# Patient Record
Sex: FEMALE | Race: WHITE | HISPANIC OR LATINO | Employment: UNEMPLOYED | URBAN - METROPOLITAN AREA
[De-identification: names, ages, dates, MRNs, and addresses within clinical notes are randomized per-mention and may not be internally consistent; named-entity substitution may affect disease eponyms.]

---
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
## 2025-03-17 ENCOUNTER — PATIENT OUTREACH (OUTPATIENT)
Dept: CARE COORDINATION | Facility: CLINIC | Age: 20
End: 2025-03-17
Payer: OTHER GOVERNMENT

## 2025-06-10 ENCOUNTER — HOSPITAL ENCOUNTER (EMERGENCY)
Facility: CLINIC | Age: 20
Discharge: HOME | End: 2025-06-10
Attending: NURSE PRACTITIONER | Admitting: NURSE PRACTITIONER

## 2025-06-10 ENCOUNTER — APPOINTMENT (OUTPATIENT)
Dept: CT IMAGING | Facility: CLINIC | Age: 20
End: 2025-06-10
Attending: NURSE PRACTITIONER

## 2025-06-10 VITALS
OXYGEN SATURATION: 96 % | BODY MASS INDEX: 30.16 KG/M2 | HEART RATE: 99 BPM | DIASTOLIC BLOOD PRESSURE: 80 MMHG | SYSTOLIC BLOOD PRESSURE: 136 MMHG | TEMPERATURE: 98.2 F | WEIGHT: 181.22 LBS | RESPIRATION RATE: 16 BRPM

## 2025-06-10 DIAGNOSIS — S06.0X0A CONCUSSION WITHOUT LOSS OF CONSCIOUSNESS, INITIAL ENCOUNTER: ICD-10-CM

## 2025-06-10 DIAGNOSIS — S09.90XA CLOSED HEAD INJURY, INITIAL ENCOUNTER: ICD-10-CM

## 2025-06-10 PROCEDURE — 99284 EMERGENCY DEPT VISIT MOD MDM: CPT | Performed by: NURSE PRACTITIONER

## 2025-06-10 PROCEDURE — 70450 CT HEAD/BRAIN W/O DYE: CPT

## 2025-06-10 PROCEDURE — 99285 EMERGENCY DEPT VISIT HI MDM: CPT | Mod: 25 | Performed by: NURSE PRACTITIONER

## 2025-06-10 PROCEDURE — 250N000011 HC RX IP 250 OP 636: Mod: JZ | Performed by: NURSE PRACTITIONER

## 2025-06-10 PROCEDURE — 96372 THER/PROPH/DIAG INJ SC/IM: CPT | Performed by: NURSE PRACTITIONER

## 2025-06-10 RX ORDER — KETOROLAC TROMETHAMINE 30 MG/ML
30 INJECTION, SOLUTION INTRAMUSCULAR; INTRAVENOUS ONCE
Status: COMPLETED | OUTPATIENT
Start: 2025-06-10 | End: 2025-06-10

## 2025-06-10 RX ADMIN — KETOROLAC TROMETHAMINE 30 MG: 30 INJECTION, SOLUTION INTRAMUSCULAR at 14:49

## 2025-06-10 ASSESSMENT — ACTIVITIES OF DAILY LIVING (ADL): ADLS_ACUITY_SCORE: 41

## 2025-06-10 NOTE — LETTER
Jenny 10, 2025      To Whom It May Concern:      Misa Guzman was seen in our Emergency Department today, 06/10/2025 for work-related injury that occurred on Friday, 6/6/2025 (head injury/concussion).    No work 6/10/2025 - 6/11/2025.   May return to work 6/12/2025 if feeling improved without restrictions. She should have visit with concussion clinic for recheck within the next couple weeks.      Sincerely,        NASREEN Rooney CNP  Electronically signed

## 2025-06-10 NOTE — ED TRIAGE NOTES
"Patient presents with concern of headache after dropping \"several cases of beer\" onto her head at work. Occurred on Friday evening. Was not seen at that time. Reports having headache since then that has not improved. Endorses photophobia and nausea. No meds PTA.     Triage Assessment (Adult)       Row Name 06/10/25 1425          Triage Assessment    Airway WDL WDL        Respiratory WDL    Respiratory WDL WDL        Skin Circulation/Temperature WDL    Skin Circulation/Temperature WDL WDL        Cardiac WDL    Cardiac WDL WDL        Peripheral/Neurovascular WDL    Peripheral Neurovascular WDL WDL        Cognitive/Neuro/Behavioral WDL    Cognitive/Neuro/Behavioral WDL WDL                     "

## 2025-06-10 NOTE — DISCHARGE INSTRUCTIONS
Tylenol 650 mg every 4-6 hours as needed for pain.  Ibuprofen 400-600 mg every 6-8 hours as needed for pain  (take with food, stop if causing stomach pains.)  Rest. Stay well hydrated.  No exertional activities or being up on heights (ie ladder).  No work today or tomorrow.    May return on 6/12/2025 if feeling better.  I recommend recheck in concussion clinic and I have sent referral. They should call you to schedule this.  
Quality 47: Advance Care Plan: Advance Care Planning discussed and documented; advance care plan or surrogate decision maker documented in the medical record.
Detail Level: Detailed
Quality 226: Preventive Care And Screening: Tobacco Use: Screening And Cessation Intervention: Patient screened for tobacco use and is an ex/non-smoker

## 2025-06-11 ENCOUNTER — PATIENT OUTREACH (OUTPATIENT)
Dept: CARE COORDINATION | Facility: CLINIC | Age: 20
End: 2025-06-11
Payer: OTHER GOVERNMENT

## 2025-06-11 NOTE — ED PROVIDER NOTES
History     Chief Complaint   Patient presents with    Headache     HPI  Misa Guzman is a 20 year old female who presents for evaluation of headache after getting hit in the head at work.  Patient reports several cases of beer fell on top of her head on Friday, 4 days ago, hitting her mostly in the left side of her head.  She did not get knocked down.  No LOC.  She has had a persistent headache since this occurred.  She is light sensitive.  Slight nausea.  Denies neck or back pain.  She did go back to work on Saturday, the next day and found this to be extremely difficult due to her headache.  She has not taken any medications to treat her headache today.    Allergies:  No Known Allergies    Problem List:    Patient Active Problem List    Diagnosis Date Noted    Moderate episode of recurrent major depressive disorder (H) 08/20/2024     Priority: Medium        Past Medical History:    No past medical history on file.    Past Surgical History:    No past surgical history on file.    Family History:    No family history on file.    Social History:  Marital Status:  Single [1]  Social History     Tobacco Use    Smoking status: Never    Smokeless tobacco: Never   Vaping Use    Vaping status: Never Used   Substance Use Topics    Alcohol use: No    Drug use: No        Medications:    albuterol (VENTOLIN HFA) 108 (90 Base) MCG/ACT inhaler  amoxicillin (AMOXIL) 875 MG tablet  DiphenhydrAMINE HCl (BENADRYL PO)  Omeprazole Magnesium (PRILOSEC PO)  ondansetron (ZOFRAN ODT) 4 MG ODT tab  sertraline (ZOLOFT) 25 MG tablet          Review of Systems  As mentioned above in the history present illness. All other systems were reviewed and are negative.    Physical Exam   BP: 136/80  Pulse: 99  Temp: 98.2  F (36.8  C)  Resp: 16  Weight: 82.2 kg (181 lb 3.5 oz)  SpO2: 96 %      Physical Exam  Constitutional:       General: She is in acute distress (light sensitive. shirt over her head.).      Appearance: She is well-developed. She is  not ill-appearing.   HENT:      Head: Normocephalic and atraumatic.      Right Ear: External ear normal.      Left Ear: External ear normal.      Nose: Nose normal.      Mouth/Throat:      Mouth: Mucous membranes are moist.   Eyes:      Conjunctiva/sclera: Conjunctivae normal.   Cardiovascular:      Rate and Rhythm: Normal rate and regular rhythm.      Heart sounds: Normal heart sounds. No murmur heard.  Pulmonary:      Effort: Pulmonary effort is normal. No respiratory distress.      Breath sounds: Normal breath sounds.   Musculoskeletal:         General: Normal range of motion.   Skin:     General: Skin is warm and dry.      Findings: No rash.   Neurological:      General: No focal deficit present.      Mental Status: She is alert and oriented to person, place, and time.         ED Course        Procedures         Results for orders placed or performed during the hospital encounter of 06/10/25 (from the past 24 hours)   CT Head w/o Contrast    Narrative    EXAM: CT HEAD W/O CONTRAST  LOCATION: [facility]  DATE: 6/10/2025    INDICATION: headache since getting hit in head left temple by case of beer 4 days ago.  COMPARISON: None.  TECHNIQUE: Routine CT Head without IV contrast. Multiplanar reformats. Dose reduction techniques were used.    FINDINGS:  INTRACRANIAL CONTENTS: No intracranial hemorrhage, extraaxial collection, or mass effect.  No CT evidence of acute infarct. Normal parenchymal attenuation. Normal ventricles and sulci.     VISUALIZED ORBITS/SINUSES/MASTOIDS: No intraorbital abnormality. No paranasal sinus mucosal disease. No middle ear or mastoid effusion.    BONES/SOFT TISSUES: No acute abnormality.      Impression    IMPRESSION:  1.  No acute intracranial findings.       Medications   ketorolac (TORADOL) injection 30 mg (30 mg Intramuscular $Given 6/10/25 1441)       Assessments & Plan (with Medical Decision Making)   20-year-old female who suffered a head injury at  work injury 4 days ago.  Several cases of beer fell on top of her head mostly on the left at her head.  Since then she has had a headache.  She is not sensitive.  Concerning symptoms for concussion.  Head CT is negative for acute intracranial hemorrhage or skull fracture.  Patient was given IM Toradol for her headache.  I have sent referral for visit with the concussion clinic.    Plan:  Tylenol 650 mg every 4-6 hours as needed for pain.  Ibuprofen 400-600 mg every 6-8 hours as needed for pain  (take with food, stop if causing stomach pains.)  Rest. Stay well hydrated.  No exertional activities or being up on heights (ie ladder).  No work today or tomorrow.    May return on 6/12/2025 if feeling better.  I recommend recheck in concussion clinic and I have sent referral. They should call you to schedule this.    Discharge Medication List as of 6/10/2025  3:53 PM          Final diagnoses:   Concussion without loss of consciousness, initial encounter   Closed head injury, initial encounter       6/10/2025   [facility] EMERGENCY DEPT       Corie Wolff APRN CNP  06/10/25 2992